# Patient Record
Sex: MALE | Race: WHITE | Employment: FULL TIME | URBAN - METROPOLITAN AREA
[De-identification: names, ages, dates, MRNs, and addresses within clinical notes are randomized per-mention and may not be internally consistent; named-entity substitution may affect disease eponyms.]

---

## 2017-04-06 PROBLEM — N32.3 BLADDER DIVERTICULUM: Status: ACTIVE | Noted: 2017-04-06

## 2020-03-04 ENCOUNTER — APPOINTMENT (OUTPATIENT)
Dept: PHYSICAL THERAPY | Age: 80
End: 2020-03-04

## 2020-03-05 ENCOUNTER — APPOINTMENT (OUTPATIENT)
Dept: PHYSICAL THERAPY | Age: 80
End: 2020-03-05

## 2020-03-06 ENCOUNTER — HOSPITAL ENCOUNTER (OUTPATIENT)
Dept: PHYSICAL THERAPY | Age: 80
Discharge: HOME | End: 2020-03-06
Payer: MEDICARE

## 2020-03-06 PROCEDURE — 97162 PT EVAL MOD COMPLEX 30 MIN: CPT

## 2020-03-06 PROCEDURE — 97110 THERAPEUTIC EXERCISES: CPT

## 2020-03-06 NOTE — PROGRESS NOTES
PHYSICAL THERAPY - DAILY TREATMENT NOTE    Patient Name: Babatunde Kiran        Date: 3/6/2020  : 1940   YES Patient  Verified  Visit #:      of   16  Insurance: Payor: [place] MEDICARE / Plan: [place] MEDICARE PART A & B / Product Type: Medicare /      In time: 8:15 AM Out time: 8:58 AM   Total Treatment Time: 43     Medicare Time /BCBS Tracking (below)   Total Timed Codes (min):  25 1:1 Treatment Time:  43     TREATMENT AREA =  Low back pain [M54.5]    SUBJECTIVE  Pain Level (on 0 to 10 scale):  3- 10   Medication Changes/New allergies or changes in medical history, any new surgeries or procedures? NO    If yes, update Summary List   Subjective Functional Status/Changes:  []  No changes reported     See POC       OBJECTIVE  Modalities Rationale:     decrease pain and increase tissue extensibility to improve patient's ability to perform LE functional tasks and ADL   min [] Estim, type/location:                                      []  att     []  unatt     []  w/US     []  w/ice    []  w/heat    min []  Mechanical Traction: type/lbs                   []  pro   []  sup   []  int   []  cont    []  before manual    []  after manual    min []  Ultrasound, settings/location:      min []  Iontophoresis w/ dexamethasone, location:                                               []  take home patch       []  in clinic   10 min []  Ice     [x]  Heat    location/position: To low back s/p eval, prone    min []  Vasopneumatic Device, press/temp:     min []  Other:    [x] Skin assessment post-treatment (if applicable):    [x]  intact    []  redness- no adverse reaction     []redness  adverse reaction:        25 min Therapeutic Exercise:  [x]  See flow sheet   Rationale:      increase ROM and increase strength to improve the patients ability to perform LE functional tasks and ADL    With TE min Patient Education:  YES  Reviewed HEP, diagnosis, prognosis, POC   []  Progressed/Changed HEP based on:        Other Objective/Functional Measures:    See POC     Post Treatment Pain Level (on 0 to 10) scale:   2-3  / 10     ASSESSMENT  Assessment/Changes in Function:     Justification for Eval Code Complexity:  Patient History: HTN, skin cancer, stroke (minor in 1999, no residual deficits); worked out at [place] [address] (treadmill, strengthening machines)  Examination: See exam  Clinical Presentation: evolving  Clinical Decision Making: MEDIUM  FOTO: 61 /100     []  See Progress Note/Recertification   Patient will continue to benefit from skilled PT services to modify and progress therapeutic interventions, address functional mobility deficits, address ROM deficits, address strength deficits, analyze and address soft tissue restrictions, analyze and cue movement patterns, analyze and modify body mechanics/ergonomics and assess and modify postural abnormalities to attain remaining goals.    Progress toward goals / Updated goals:    See POC     PLAN  [x]  Upgrade activities as tolerated YES Continue plan of care   []  Discharge due to :    []  Other:      Therapist: Julianne Cranker, PT    Date: 3/6/2020 Time: 8:15 AM     Future Appointments   Date Time Provider Alejandra Leal   1/13/2021  8:30 AM Shira Alas MD [facility]

## 2020-03-06 NOTE — PROGRESS NOTES
[address] PHYSICAL THERAPY AT [address], [place] [address] [facility], [address] - Phone: [phone]  Fax: [phone] / [address] [place] [address] PHYSICAL THERAPY SERVICES  Patient Name: Babatunde Kiran : 1940   Medical   Diagnosis: Low back pain [M54.5] Treatment Diagnosis: Low back pain [M54.5]   Onset Date:      Referral Source: Kamaljit Gallagher MD Start of Care [facility]): 3/6/2020   Prior Hospitalization: See medical history Provider #: [id number]   Prior Level of Function: WNL, worked out at [place] [address] (treadmill, strengthening machines)   Comorbidities: HTN, skin cancer, stroke (minor in , no residual deficits)   Medications: Verified on Patient Summary List   The Plan of Care and following information is based on the information from the initial evaluation.   ==================================================================================  Assessment / key information: Patient is [de-identified] y.o. yo [facility] with diagnosis of Low back pain [M54.5]. Pt reports insidious onset LBP . Objective data detailed below. Patient scored 64 on FOTO indicating decreased function and quality of life. Functional limitations include difficulty with early in the morning, heavy lifting, sit-stand transfers, stairs negotiation, bed mobility, donning/doffing clothes, bathing, car ingress/egress. Pt has not had skilled PT services before. Pt would benefit from skilled PT services to address impairments, work towards goals, and return to PLOF. Pain: current 3-4/10, at worst 5-6/10, at best 0/10  Aggravating factors: early in the morning, heavy lifting, sit-stand transfers, stairs negotiation, bed mobility, donning/doffing clothes, bathing, car ingress  Alleviating factors: movement, going about his routine, moist heat  Pain description: strong ache  Pain location: middle of the lower back  Radiation? Numbness/tingling?  None repored    Lumbar AROM/pain: flex WFL, ext 30% limited, SB R 20% limited discomfort L 20% limited discomfort, rot R WFL L WFL  Hip MMT: flex R 4+ L 5, ext R 3- L 3, ABD R 3- L 3, ADD R 3+ L 3-, IR R 4- L 4-, ER R 4+ L 4+  Knee MMT: grossly WNL bilat  Ankle MMT: DF R 5 L 5, PF R 3 L 3, EV R 5 L 5, INV R 5 L 5  Sit-to-stand: dec forw flex phase, inc time to complete vertical thrust; has wallet in back pocket  Ambulation: step-through patterning with R lat lean, dec weight shift/acceptance towards LLE  Stairs negotiation: unremarkable  Palpation: TTP bilat lumbar paraspinals, piriformis musculature; slight pain relief with PA mobilizations Grade I/II    Treatment performed: MH to LB s/p eval in prone x10 mins  Patient response to treatment: Pt reports dec symptoms s/p MH application  ==================================================================================  Eval Complexity: History: HIGH Complexity :3+ comorbidities / personal factors will impact the outcome/ POC Exam:MEDIUM Complexity : 3 Standardized tests and measures addressing body structure, function, activity limitation and / or participation in recreation  Presentation: MEDIUM Complexity : Evolving with changing characteristics  Clinical Decision Making:MEDIUM Complexity : FOTO score of 26-74Overall Complexity:MEDIUM  Problem List: pain affecting function, decrease ROM, decrease strength, decrease ADL/ functional abilitiies, decrease activity tolerance, decrease flexibility/ joint mobility and decrease transfer abilities   Treatment Plan may include any combination of the following: Therapeutic exercise, Therapeutic activities, Neuromuscular re-education, Physical agent/modality, Gait/balance training, Manual therapy, Patient education, Self Care training, Functional mobility training and Stair training  Patient / Family readiness to learn indicated by: asking questions, trying to perform skills and interest  Persons(s) to be included in education: patient (P)  Barriers to Learning/Limitations: None  Measures taken:    Patient Goal (s): \"Make the pain go away\"   Patient self reported health status: excellent  Rehabilitation Potential: good   Short Term Goals: To be accomplished in 4 weeks:  1) Patient performing daily HEP. 2) Patient will report 50% improvement in ability to perform LE functional tasks, ADL, work duties. 3) Patient to demo lumbar AROM all planes WNL without pain to facilitate LE functional tasks, ADL, work duties.  Long Term Goals: To be accomplished in 8 weeks:  1) Patient Independent with progressive HEP. 2) Patient to demo BLE MMT >=4+ to facilitate LE functional tasks, ADL, work duties. 3) Increase FOTO score to 69 indicating improved function and quality of life. Frequency / Duration:   Patient to be seen 2 times per week for 8 weeks:  Patient / Caregiver education and instruction: exercises and other diagnosis, prognosis, POC    Therapist Signature: Anika Gonzalez, LORRIE Date: 7/5/1362   Certification Period: 3-6-2020 to 6-4-2020 Time: 8:14 AM   ==================================================================================  I certify that the above Physical Therapy Services are being furnished while the patient is under my care. I agree with the treatment plan and certify that this therapy is necessary. Physician Signature:        Date:       Time:     Please sign and return to [facility] or you may fax the signed copy to [phone]. Thank you.

## 2020-03-18 ENCOUNTER — HOSPITAL ENCOUNTER (OUTPATIENT)
Dept: PHYSICAL THERAPY | Age: 80
Discharge: HOME | End: 2020-03-18
Payer: MEDICARE

## 2020-03-18 ENCOUNTER — APPOINTMENT (OUTPATIENT)
Dept: PHYSICAL THERAPY | Age: 80
End: 2020-03-18
Payer: MEDICARE

## 2020-03-18 PROCEDURE — 97112 NEUROMUSCULAR REEDUCATION: CPT

## 2020-03-18 PROCEDURE — 97110 THERAPEUTIC EXERCISES: CPT

## 2020-03-18 PROCEDURE — 97140 MANUAL THERAPY 1/> REGIONS: CPT

## 2020-03-18 NOTE — PROGRESS NOTES
PHYSICAL THERAPY - DAILY TREATMENT NOTE    Patient Name: Karolyn Oliver        Date: 3/18/2020  : 1940   YES Patient  Verified  Visit #:   2   of   16  Insurance: Payor: [place] MEDICARE / Plan: VA MEDICARE PART A & B / Product Type: Medicare /      In time: 3:25 PM Out time: 4:31 PM   Total Treatment Time: 66     Medicare Time /BCBS Tracking (below)   Total Timed Codes (min):  56 1:1 Treatment Time:  38     TREATMENT AREA =  Low back pain [M54.5]    SUBJECTIVE  Pain Level (on 0 to 10 scale):  2  / 10   Medication Changes/New allergies or changes in medical history, any new surgeries or procedures? NO    If yes, update Summary List   Subjective Functional Status/Changes:  []  No changes reported     Pt reports no significant change since IE, has not been performing HEP as prescribed. OBJECTIVE  Modalities Rationale:     decrease pain and increase tissue extensibility to improve patient's ability to perform LE functional tasks and ADL   min [] Estim, type/location:                                     []  att     []  unatt     []  w/US     []  w/ice    []  w/heat    min []  Mechanical Traction: type/lbs                   []  pro   []  sup   []  int   []  cont    []  before manual    []  after manual    min []  Ultrasound, settings/location:      min []  Iontophoresis w/ dexamethasone, location:                                               []  take home patch       []  in clinic   10 min []  Ice     [x]  Heat    location/position:  To low back s/p session, prone    min []  Vasopneumatic Device, press/temp:     min []  Other:    [x] Skin assessment post-treatment (if applicable):    [x]  intact    []  redness- no adverse reaction     []redness  adverse reaction:        (32) 40 min Therapeutic Exercise:  [x]  See flow sheet   Rationale:      increase ROM and increase strength to improve the patients ability to perform LE functional tasks and ADL    8 min Manual Therapy: PA mobs Grade I/II lumbar spine, MFR to bilat lumbar paraspinal musculature   Rationale:      decrease pain, increase ROM, increase tissue extensibility and decrease trigger points to improve patient's ability to perform LE functional tasks and ADL    8 min Neuromuscular Re-ed: TA sets, BKFO   Rationale:    increase strength, improve coordination and increase proprioception to improve the patients ability to perform core stabilization with functional movement    With TE min Patient Education:  YES  Reviewed HEP   []  Progressed/Changed HEP based on: Other Objective/Functional Measures: Added exercises per flow sheet     Post Treatment Pain Level (on 0 to 10) scale:   1  / 10     ASSESSMENT  Assessment/Changes in Function:     Pt tolerated today's session without significant symptom exacerbation. Manual intervention tolerated. Inc tone noted L lumbar paraspinals during MFR. Encouraged HEP performance for symptom management and appt carry over. Reviewed wallet placement for sitting. []  See Progress Note/Recertification   Patient will continue to benefit from skilled PT services to modify and progress therapeutic interventions, address functional mobility deficits, address ROM deficits, address strength deficits, analyze and address soft tissue restrictions, analyze and cue movement patterns, analyze and modify body mechanics/ergonomics and assess and modify postural abnormalities to attain remaining goals. Progress toward goals / Updated goals:    · Goals: To be accomplished in 4 weeks per POC 3-6-20:  1) Patient performing daily HEP. 2) Patient will report 50% improvement in ability to perform LE functional tasks, ADL, work duties. 3) Patient to demo lumbar AROM all planes WNL without pain to facilitate LE functional tasks, ADL, work duties.        PLAN  [x]  Upgrade activities as tolerated YES Continue plan of care   []  Discharge due to :    []  Other:      Therapist: Edie Prakash PT    Date: 3/18/2020 Time: 4:08 PM Future Appointments   Date Time Provider Alejandra Leal   3/19/2020  3:00 PM [place], [place] [facility]   3/24/2020  3:00 PM Kirt Mauricio, PT [facility]   3/26/2020  2:00 PM Kirt Mauricio, PT [facility]   3/31/2020  2:30 PM Kirt Mauricio, PT [facility]   4/2/2020 10:30 AM Kirt Mauricio, PT [facility]   4/7/2020  1:30 PM Kirt Mauricio, PT [facility]   4/9/2020  2:30 PM Kirt Mauricio, PT [facility]   4/21/2020  2:30 PM Kirt Mauricio, PT [facility]   4/23/2020 10:30 AM Kirt Mauricio, PT [facility]   4/28/2020 10:30 AM Kirt Mauricio, PT [facility]   4/30/2020 10:30 AM Kirt Mauricio, PT [facility]   5/5/2020 10:30 AM Kirt Mauricio, PT [facility]   5/7/2020 10:30 AM Kirt Mauricio, PT [facility]   1/13/2021  8:30 AM Salvador Lerma MD [facility]

## 2020-03-19 ENCOUNTER — HOSPITAL ENCOUNTER (OUTPATIENT)
Dept: PHYSICAL THERAPY | Age: 80
Discharge: HOME | End: 2020-03-19
Payer: MEDICARE

## 2020-03-19 PROCEDURE — 97112 NEUROMUSCULAR REEDUCATION: CPT

## 2020-03-19 PROCEDURE — 97110 THERAPEUTIC EXERCISES: CPT

## 2020-03-19 PROCEDURE — 97140 MANUAL THERAPY 1/> REGIONS: CPT

## 2020-03-19 NOTE — PROGRESS NOTES
PHYSICAL THERAPY - DAILY TREATMENT NOTE    Patient Name: Marisol Ventura        Date: 3/19/2020  : 1940   YES Patient  Verified  Visit #:   3   of   16  Insurance: Payor: [place] MEDICARE / Plan: VA MEDICARE PART A & B / Product Type: Medicare /      In time: 3:18 PM Out time: 4:18 PM   Total Treatment Time: 60     Medicare Time /BCBS Tracking (below)   Total Timed Codes (min):  50 1:1 Treatment Time:  50     TREATMENT AREA =  Low back pain [M54.5]    SUBJECTIVE  Pain Level (on 0 to 10 scale):  3  / 10   Medication Changes/New allergies or changes in medical history, any new surgeries or procedures? NO    If yes, update Summary List   Subjective Functional Status/Changes:  []  No changes reported     Pt reports some soreness from last appt, but overall is improving. OBJECTIVE  Modalities Rationale:     decrease pain and increase tissue extensibility to improve patient's ability to perform LE functional tasks and ADL   min [] Estim, type/location:                                     []  att     []  unatt     []  w/US     []  w/ice    []  w/heat    min []  Mechanical Traction: type/lbs                   []  pro   []  sup   []  int   []  cont    []  before manual    []  after manual    min []  Ultrasound, settings/location:      min []  Iontophoresis w/ dexamethasone, location:                                               []  take home patch       []  in clinic   10 min []  Ice     [x]  Heat    location/position:  To low back s/p session, prone    min []  Vasopneumatic Device, press/temp:     min []  Other:    [] Skin assessment post-treatment (if applicable):    []  intact    []  redness- no adverse reaction     []redness  adverse reaction:        36 min Therapeutic Exercise:  [x]  See flow sheet   Rationale:      increase ROM and increase strength to improve the patients ability to perform LE functional tasks and ADL    8 min Manual Therapy: PA mobs Grade I/II lumbar spine, MFR to bilat lumbar paraspinal musculature   Rationale:      decrease pain, increase ROM, increase tissue extensibility and decrease trigger points to improve patient's ability to perform LE functional tasks and ADL    8 min Neuromuscular Re-ed: TA sets, BKFO   Rationale:    increase strength, improve coordination and increase proprioception to improve the patients ability to perform core stabilization with functional movement    With TE min Patient Education:  YES  Reviewed HEP   []  Progressed/Changed HEP based on: Other Objective/Functional Measures:    No changes to program this date     Post Treatment Pain Level (on 0 to 10) scale:   1-2  / 10     ASSESSMENT  Assessment/Changes in Function:     Overall tolerance to session. No symptom exacerbation reported. Pt with good recall of exercises, req'd intermittent verbal cues for overall flow of exercises program and core stabilization exercises. Increased tone noted with during MT intervention lumbar paraspinals R>L. []  See Progress Note/Recertification   Patient will continue to benefit from skilled PT services to modify and progress therapeutic interventions, address functional mobility deficits, address ROM deficits, address strength deficits, analyze and address soft tissue restrictions, analyze and cue movement patterns, analyze and modify body mechanics/ergonomics and assess and modify postural abnormalities to attain remaining goals. Progress toward goals / Updated goals:    · Goals: To be accomplished in 4 weeks per POC 3-6-20:  1) Patient performing daily HEP.     2) Patient will report 50% improvement in ability to perform LE functional tasks, ADL, work duties.   3) Patient to demo lumbar AROM all planes WNL without pain to facilitate LE functional tasks, ADL, work duties.        PLAN  [x]  Upgrade activities as tolerated YES Continue plan of care   []  Discharge due to :    []  Other:      Therapist: Marv Manzano, PT    Date: 3/19/2020 Time: 4:08 PM     Future Appointments   Date Time Provider Alejandra Mel   3/24/2020  3:00 PM Kathlene Gilford, [place] [facility]   3/26/2020  2:00 PM Kathlene Gilford, PT [facility]   3/31/2020  2:30 PM Kathlene Gilford, PT [facility]   4/2/2020 10:30 AM Kathlene Gilford, PT [facility]   4/7/2020  1:30 PM Kathlene Gilford, PT [facility]   4/9/2020  2:30 PM Kathlene Gilford, PT [facility]   4/21/2020  2:30 PM Kathlene Gilford, PT [facility]   4/23/2020 10:30 AM Kathlene Gilford, PT [facility]   4/28/2020 10:30 AM Kathlene Gilford, PT [facility]   4/30/2020 10:30 AM Kathlene Gilford, PT [facility]   5/5/2020 10:30 AM Kathlene Gilford, PT [facility]   5/7/2020 10:30 AM Kathlene Gilford, PT [facility]   1/13/2021  8:30 AM Dayday Aguayo MD [facility]

## 2020-03-20 ENCOUNTER — APPOINTMENT (OUTPATIENT)
Dept: PHYSICAL THERAPY | Age: 80
End: 2020-03-20
Payer: MEDICARE

## 2020-03-24 ENCOUNTER — APPOINTMENT (OUTPATIENT)
Dept: PHYSICAL THERAPY | Age: 80
End: 2020-03-24
Payer: MEDICARE

## 2020-03-26 ENCOUNTER — APPOINTMENT (OUTPATIENT)
Dept: PHYSICAL THERAPY | Age: 80
End: 2020-03-26
Payer: MEDICARE

## 2020-03-31 ENCOUNTER — APPOINTMENT (OUTPATIENT)
Dept: PHYSICAL THERAPY | Age: 80
End: 2020-03-31
Payer: MEDICARE

## 2020-04-02 ENCOUNTER — APPOINTMENT (OUTPATIENT)
Dept: PHYSICAL THERAPY | Age: 80
End: 2020-04-02

## 2020-04-07 ENCOUNTER — APPOINTMENT (OUTPATIENT)
Dept: PHYSICAL THERAPY | Age: 80
End: 2020-04-07

## 2020-04-09 ENCOUNTER — APPOINTMENT (OUTPATIENT)
Dept: PHYSICAL THERAPY | Age: 80
End: 2020-04-09

## 2020-04-09 NOTE — PROGRESS NOTES
[address] PHYSICAL THERAPY AT [place]  [address] [facility], [place] [address] - Phone: [phone]  Fax: [phone]  PROGRESS NOTE  Patient Name: Arta Goldberg : 1940   Treatment/Medical Diagnosis: Low back pain [M54.5]   Referral Source: Olga Blackman MD     Date of Initial Visit: 3-6-20 Attended Visits: 3 Missed Visits: 0     CURRENT STATUS    Thank you for sending your patient to be cared for in our facility. While we are STILL OPEN for essential patients, this pt has been placed on a temporarily hold due to the nationwide concerns over COVID-19. The physical therapist has issued HEP and therapy staff will continue to contact pt every 1-2 weeks via phone to monitor progress as the patient feels necessary. If the patient desires to return, we plan to resume physical therapy once concerns improve and will be performing reassessments upon return. Thank you and please feel free to reach out to use with any questions or concerns. If you have any questions/comments please contact us directly at [phone]. Thank you for allowing us to assist in the care of your patient. LPTA Signature: Amaris Burgos PTA Date: 2020   PT Signature: Messi Stone PT Time: 4:11 PM   NOTE TO PHYSICIAN:  PLEASE COMPLETE THE ORDERS BELOW AND FAX TO   [facility]: [phone]. If you are unable to process this request in 24 hours please contact our office: [phone].    ___ I have read the above report and request that my patient continue as recommended.   ___ I have read the above report and request that my patient continue therapy with the following changes/special instructions:_________________________________________________________   ___ I have read the above report and request that my patient be discharged from therapy.      Physician Signature:        Date:       Time:

## 2020-04-21 ENCOUNTER — APPOINTMENT (OUTPATIENT)
Dept: PHYSICAL THERAPY | Age: 80
End: 2020-04-21

## 2020-04-23 ENCOUNTER — APPOINTMENT (OUTPATIENT)
Dept: PHYSICAL THERAPY | Age: 80
End: 2020-04-23

## 2020-04-28 ENCOUNTER — APPOINTMENT (OUTPATIENT)
Dept: PHYSICAL THERAPY | Age: 80
End: 2020-04-28

## 2020-04-30 ENCOUNTER — APPOINTMENT (OUTPATIENT)
Dept: PHYSICAL THERAPY | Age: 80
End: 2020-04-30

## 2020-05-05 ENCOUNTER — APPOINTMENT (OUTPATIENT)
Dept: PHYSICAL THERAPY | Age: 80
End: 2020-05-05

## 2020-05-07 ENCOUNTER — APPOINTMENT (OUTPATIENT)
Dept: PHYSICAL THERAPY | Age: 80
End: 2020-05-07

## 2020-06-01 NOTE — PROGRESS NOTES
[facility] PHYSICAL THERAPY  [address] [place], [place] [address] - Phone: [phone]  Fax: [phone]:         PHYSICAL THERAPY            Patient Name: Karolyn Oliver : 1940   Treatment Diagnosis: Low back pain [M54.5] Onset Date:    Referral Source: Brian Hathaway MD [facility]): 3/6/2020   Prior Hospitalization: See medical history Provider #: [id number]   Prior Level of Function: WNL, worked out at [place] [address] (treadmill, strengthening machines)   Comorbidities: HTN, skin cancer, stroke (minor in , no residual deficits)   Visits from [facility]: 3 Missed Visits: 0     Goal/Measure of Progress Goal Met? 1. Patient performing daily HEP. Status at last Eval: Dependent  Current Status: Unable to formally reassess. no   2. Patient will report 50% improvement in ability to perform LE functional tasks, ADL, work duties. Status at last Eval: na Current Status: Unable to formally reassess. no   3. Patient to demo lumbar AROM all planes WNL without pain to facilitate LE functional tasks, ADL, work duties   Status at last Eval: Lumbar AROM/pain: flex WFL, ext 30% limited, SB R 20% limited discomfort L 20% limited discomfort, rot R WFL L WFL Current Status: Unable to formally reassess. no     Key Functional Changes/Progress: Patient did not return to PT after 3-19-20 visit. Patient on hold from PT secondary to COVID 19 restrictions. Patient did not return follow up calls from office. Assessments/Recommendations: Other: Patient did not return to PT  Specifics    If you have any questions/comments please contact us directly at [phone]. Thank you for allowing us to assist in the care of your patient. Therapist Signature:  Arvin Shaikh PT Date: 2020   Reporting Period: 3-6-20 to 3-19-20 Time: 4:07 PM   NOTE TO PHYSICIAN:  PLEASE COMPLETE THE ORDERS BELOW AND [facility]X TO [facility]: [phone]  If you are unable to process this request in 24 hours please contact our office: [phone]    ___ I have read the above report and request that my patient continue as recommended.   ___ I have read the above report and request that my patient continue therapy with the following changes/special instructions:_________________________________________________________   ___ I have read the above report and request that my patient be discharged from therapy.      Physician Signature:        Date:       Time:

## 2021-11-05 PROBLEM — R35.0 FREQUENCY OF MICTURITION: Status: ACTIVE | Noted: 2021-11-05

## 2021-11-05 PROBLEM — I63.30 CEREBRAL INFARCTION DUE TO THROMBOSIS OF CEREBRAL ARTERY (HCC): Status: ACTIVE | Noted: 2021-11-05

## 2021-11-05 PROBLEM — Z90.79 S/P TURP (STATUS POST TRANSURETHRAL RESECTION OF PROSTATE): Status: ACTIVE | Noted: 2021-07-19

## 2021-11-05 PROBLEM — I77.9 DISORDER OF ARTERIES AND ARTERIOLES, UNSPECIFIED (HCC): Status: ACTIVE | Noted: 2021-11-05

## 2021-12-01 PROBLEM — M48.00 SPINAL STENOSIS: Status: ACTIVE | Noted: 2021-12-01
